# Patient Record
Sex: FEMALE | Race: OTHER | URBAN - METROPOLITAN AREA
[De-identification: names, ages, dates, MRNs, and addresses within clinical notes are randomized per-mention and may not be internally consistent; named-entity substitution may affect disease eponyms.]

---

## 2018-03-26 ENCOUNTER — APPOINTMENT (RX ONLY)
Dept: URBAN - METROPOLITAN AREA CLINIC 51 | Facility: CLINIC | Age: 43
Setting detail: DERMATOLOGY
End: 2018-03-26

## 2018-03-26 DIAGNOSIS — L21.8 OTHER SEBORRHEIC DERMATITIS: ICD-10-CM

## 2018-03-26 PROBLEM — F32.9 MAJOR DEPRESSIVE DISORDER, SINGLE EPISODE, UNSPECIFIED: Status: ACTIVE | Noted: 2018-03-26

## 2018-03-26 PROBLEM — M12.9 ARTHROPATHY, UNSPECIFIED: Status: ACTIVE | Noted: 2018-03-26

## 2018-03-26 PROBLEM — F41.9 ANXIETY DISORDER, UNSPECIFIED: Status: ACTIVE | Noted: 2018-03-26

## 2018-03-26 PROCEDURE — ? COUNSELING

## 2018-03-26 PROCEDURE — 99243 OFF/OP CNSLTJ NEW/EST LOW 30: CPT

## 2018-03-26 PROCEDURE — ? PRESCRIPTION

## 2018-03-26 RX ORDER — KETOCONAZOLE 20 MG/G
CREAM TOPICAL BID
Qty: 2 | Refills: 0 | Status: ERX | COMMUNITY
Start: 2018-03-26

## 2018-03-26 RX ADMIN — KETOCONAZOLE: 20 CREAM TOPICAL at 00:00

## 2018-03-26 ASSESSMENT — LOCATION DETAILED DESCRIPTION DERM
LOCATION DETAILED: LEFT MEDIAL MALAR CHEEK
LOCATION DETAILED: LEFT LOWER CUTANEOUS LIP
LOCATION DETAILED: PHILTRUM
LOCATION DETAILED: LEFT INFERIOR FOREHEAD
LOCATION DETAILED: RIGHT MEDIAL MALAR CHEEK

## 2018-03-26 ASSESSMENT — LOCATION SIMPLE DESCRIPTION DERM
LOCATION SIMPLE: LEFT FOREHEAD
LOCATION SIMPLE: LEFT CHEEK
LOCATION SIMPLE: UPPER LIP
LOCATION SIMPLE: LEFT LIP
LOCATION SIMPLE: RIGHT CHEEK

## 2018-03-26 ASSESSMENT — LOCATION ZONE DERM
LOCATION ZONE: LIP
LOCATION ZONE: FACE

## 2018-03-26 NOTE — PROCEDURE: MIPS QUALITY
Quality 431: Preventive Care And Screening: Unhealthy Alcohol Use - Screening: Patient screened for unhealthy alcohol use using a single question and scores less than 2 times per year
Quality 110: Preventive Care And Screening: Influenza Immunization: Influenza immunization was not ordered or administered, reason not given
Quality 226: Preventive Care And Screening: Tobacco Use: Screening And Cessation Intervention: Patient screened for tobacco and is a smoker AND received Cessation Counseling
Detail Level: Zone

## 2018-07-18 ENCOUNTER — HOSPITAL ENCOUNTER (EMERGENCY)
Facility: OTHER | Age: 43
Discharge: HOME | End: 2018-07-18
Attending: EMERGENCY MEDICINE

## 2018-07-18 VITALS
RESPIRATION RATE: 20 BRPM | HEIGHT: 72 IN | BODY MASS INDEX: 39.68 KG/M2 | DIASTOLIC BLOOD PRESSURE: 91 MMHG | SYSTOLIC BLOOD PRESSURE: 138 MMHG | HEART RATE: 72 BPM | TEMPERATURE: 98 F | OXYGEN SATURATION: 98 % | WEIGHT: 293 LBS

## 2018-07-18 DIAGNOSIS — T14.90XA TRAUMA: ICD-10-CM

## 2018-07-18 DIAGNOSIS — M25.561 ACUTE PAIN OF RIGHT KNEE: Primary | ICD-10-CM

## 2018-07-18 LAB
B-HCG UR QL: NEGATIVE
CTP QC/QA: YES

## 2018-07-18 PROCEDURE — 81025 URINE PREGNANCY TEST: CPT | Performed by: EMERGENCY MEDICINE

## 2018-07-18 PROCEDURE — 99284 EMERGENCY DEPT VISIT MOD MDM: CPT | Mod: 25

## 2018-07-18 PROCEDURE — 63600175 PHARM REV CODE 636 W HCPCS: Performed by: PHYSICIAN ASSISTANT

## 2018-07-18 PROCEDURE — 96372 THER/PROPH/DIAG INJ SC/IM: CPT

## 2018-07-18 RX ORDER — DICLOFENAC SODIUM 25 MG/1
25 TABLET, DELAYED RELEASE ORAL 3 TIMES DAILY PRN
Qty: 30 TABLET | Refills: 0 | Status: SHIPPED | OUTPATIENT
Start: 2018-07-18

## 2018-07-18 RX ORDER — NAPROXEN 500 MG/1
500 TABLET ORAL 2 TIMES DAILY
COMMUNITY
End: 2018-07-18 | Stop reason: ALTCHOICE

## 2018-07-18 RX ORDER — KETOROLAC TROMETHAMINE 30 MG/ML
30 INJECTION, SOLUTION INTRAMUSCULAR; INTRAVENOUS
Status: COMPLETED | OUTPATIENT
Start: 2018-07-18 | End: 2018-07-18

## 2018-07-18 RX ADMIN — KETOROLAC TROMETHAMINE 30 MG: 30 INJECTION, SOLUTION INTRAMUSCULAR at 01:07

## 2018-07-18 NOTE — ED TRIAGE NOTES
"Pt Presents to ED via POV with C/O right knee pain from fall out of car x 1 week. RN noted swelling around knee. Pt states " I fell coming out of my sister's car after [employer]". Pt also states " I have been taken Naproxen". Pt denies numbness, tingling, chest pain, SOB, fever, N/V, diarrhea. Pt AAO x 4, Pt appears calm and cooperative..   "

## 2018-07-18 NOTE — ED PROVIDER NOTES
Encounter Date: 7/18/2018       History     Chief Complaint   Patient presents with    Knee Pain     Pt reports twisting her knee while getting out of a vehicle. Ambulatory at this encounter. Describes pain as dull, constant. Denies any numbness/tingling.     43-year-old female with no significant past medical history presents to the emergency department with complaints of right knee pain.  She states that approximately 10 days ago she was getting out of her sister's car when she stepped onto the grass.  She states that there must have been a pothole there that she stepped into and twisted her knee.  She states my knee went 1 way and I went the other way. She complains of persistent pain since the injury. She admits that she did naproxen for 2 days without improvement she stopped taking the medication.  She denies any numbness or weakness. She states that she has been able to ambulate however favoring the knee.  She denies any significant pain at rest but states it is worse with movement certain positions and with walking and bearing weight.  She denies redness, warmth, fever, chills, swelling      The history is provided by the patient.     Review of patient's allergies indicates:   Allergen Reactions    Pcn [penicillins] Hives     History reviewed. No pertinent past medical history.  History reviewed. No pertinent surgical history.  History reviewed. No pertinent family history.  Social History   Substance Use Topics    Smoking status: Current Every Day Smoker     Packs/day: 1.00     Types: Cigarettes    Smokeless tobacco: Never Used    Alcohol use Yes     Review of Systems   Constitutional: Negative for chills and fever.   HENT: Negative for sore throat.    Respiratory: Negative for shortness of breath.    Cardiovascular: Negative for chest pain.   Gastrointestinal: Negative for nausea and vomiting.   Genitourinary: Negative for dysuria.   Musculoskeletal: Positive for arthralgias and gait problem.  Negative for back pain, joint swelling, neck pain and neck stiffness.   Skin: Negative for color change, rash and wound.   Neurological: Negative for weakness and numbness.   Hematological: Does not bruise/bleed easily.       Physical Exam     Initial Vitals [07/18/18 1219]   BP Pulse Resp Temp SpO2   (!) 130/90 82 18 97.6 °F (36.4 °C) 98 %      MAP       --         Physical Exam    Nursing note and vitals reviewed.  Constitutional: She appears well-developed and well-nourished. She is not diaphoretic. She is Obese .  Non-toxic appearance. No distress.   HENT:   Head: Normocephalic and atraumatic.   Right Ear: External ear normal.   Left Ear: External ear normal.   Nose: Nose normal.   Eyes: Conjunctivae and lids are normal. No scleral icterus.   Neck: Normal range of motion and phonation normal. Neck supple.   Cardiovascular: Normal rate, regular rhythm and intact distal pulses.   Abdominal: Normal appearance.   Musculoskeletal: Normal range of motion.        Right knee: She exhibits effusion. She exhibits normal range of motion, no swelling, no ecchymosis, no deformity, no laceration, no erythema, normal alignment, no LCL laxity, normal patellar mobility, no bony tenderness, normal meniscus and no MCL laxity. No tenderness found. No medial joint line, no lateral joint line, no MCL, no LCL and no patellar tendon tenderness noted.   No obvious deformities, moving all extremities, normal gait   Neurological: She is alert and oriented to person, place, and time. She has normal strength. She displays no atrophy. No sensory deficit. She exhibits normal muscle tone.   Skin: Skin is warm, dry and intact. Capillary refill takes less than 2 seconds. No abrasion, no bruising, no ecchymosis, no laceration, no lesion, no rash and no abscess noted. No erythema.   Psychiatric: She has a normal mood and affect. Her speech is normal and behavior is normal. Judgment normal. Cognition and memory are normal.         ED Course    Procedures  Labs Reviewed   POCT URINE PREGNANCY          Imaging Results          X-Ray Knee 3 View Right (Final result)  Result time 07/18/18 12:56:35    Final result by Stanislav Vasques MD (07/18/18 12:56:35)                 Impression:      No acute displaced fracture-dislocation identified.      Electronically signed by: Stanislav Vasques MD  Date:    07/18/2018  Time:    12:56             Narrative:    EXAMINATION:  XR KNEE 3 VIEW RIGHT    CLINICAL HISTORY:  Injury, unspecified, initial encounter    TECHNIQUE:  AP, lateral, and Merchant views of the right knee were performed.    COMPARISON:  None    FINDINGS:  Bones are well mineralized. Alignment is within normal limits.  No displaced fracture, dislocation or destructive osseous process.  No large suprapatellar joint effusion.  Minimal tricompartmental degenerative change.  Enthesophyte at the quadriceps insertion site.  No subcutaneous emphysema or radiodense retained foreign body.                                 Medical Decision Making:   History:   Old Medical Records: I decided to obtain old medical records.  Initial Assessment:   43-year-old female with complaints consistent with right knee pain after twisting it 10 days ago.  Vital signs stable, afebrile, neurovascularly intact.  She is alert and healthy and nontoxic appearing.  She is in no apparent distress. No focal motor deficits.  Exam documented above.  She does have a fusion noted but no bony tenderness palpation or bony deformity.  No signs of serious bacterial infection, septic joint, cellulitis or abscess.  I doubt fracture dislocation.   Clinical Tests:   Radiological Study: Ordered and Reviewed  ED Management:  X-ray obtained with no evidence of fracture or dislocation.  She she does have minimal tricompartmental degenerative changes.  And as a fight at the quadriceps insertion site.  Patient informed of these results.  Ace wrap was applied to the administered Toradol.  Will discharge home with  a prescription for NSAIDs and care instructions.  She is to follow up with Orthopedics at 1st available appointment or return for any worsening signs or symptoms. She states understanding agrees with this plan.  This is the extent of patient's complaints today.  This patient was discussed with the attending physician who agrees with treatment plan.  Other:   I have discussed this case with another health care provider.       <> Summary of the Discussion: Jc  This note was created using [employer] dictation.  There may be typographical errors secondary to dictation.                        Clinical Impression:     1. Acute pain of right knee    2. Trauma            Disposition:   Disposition: Discharged  Condition: Stable                        Taylor Hawk PA-C  07/18/18 0838